# Patient Record
Sex: MALE | Race: ASIAN | NOT HISPANIC OR LATINO | Employment: FULL TIME | URBAN - METROPOLITAN AREA
[De-identification: names, ages, dates, MRNs, and addresses within clinical notes are randomized per-mention and may not be internally consistent; named-entity substitution may affect disease eponyms.]

---

## 2017-01-05 ENCOUNTER — OFFICE VISIT (OUTPATIENT)
Dept: INTERNAL MEDICINE | Facility: CLINIC | Age: 27
End: 2017-01-05
Payer: COMMERCIAL

## 2017-01-05 VITALS
TEMPERATURE: 98 F | WEIGHT: 149.5 LBS | SYSTOLIC BLOOD PRESSURE: 116 MMHG | DIASTOLIC BLOOD PRESSURE: 60 MMHG | BODY MASS INDEX: 26.48 KG/M2

## 2017-01-05 DIAGNOSIS — K29.00 OTHER ACUTE GASTRITIS WITHOUT HEMORRHAGE: Primary | ICD-10-CM

## 2017-01-05 PROCEDURE — 1159F MED LIST DOCD IN RCRD: CPT | Mod: S$GLB,,, | Performed by: FAMILY MEDICINE

## 2017-01-05 PROCEDURE — 99999 PR PBB SHADOW E&M-EST. PATIENT-LVL III: CPT | Mod: PBBFAC,,, | Performed by: FAMILY MEDICINE

## 2017-01-05 PROCEDURE — 99203 OFFICE O/P NEW LOW 30 MIN: CPT | Mod: S$GLB,,, | Performed by: FAMILY MEDICINE

## 2017-01-05 RX ORDER — OMEPRAZOLE 40 MG/1
40 CAPSULE, DELAYED RELEASE ORAL
Qty: 30 CAPSULE | Refills: 0 | Status: SHIPPED | OUTPATIENT
Start: 2017-01-05 | End: 2018-06-11

## 2017-01-05 NOTE — MR AVS SNAPSHOT
[facility] Internal Medicine  [address] [facility] [place] [address]  Phone: [phone]  Fax: [phone]                  Juan Luis   2017 10:00 AM   Office Visit    Description:  Male : 1990   Provider:  John Paul Aguirre MD   Department:  [facility] Internal Medicine           Reason for Visit     GI Problem           Diagnoses this Visit        Comments    Other acute gastritis without hemorrhage    -  Primary            To Do List           Future Appointments        Provider Department Dept Phone    2017 2:00 PM MD Sukumar Weiner IIIal - Gastroenterology [phone]    2017 10:20 AM John Paul Aguirre MD [facility] Internal Medicine [phone]      Goals (5 Years of Data)     None      Follow-Up and Disposition     Return in about 2 weeks (around 2017).    Follow-up and Disposition History       These Medications        Disp Refills Start End    omeprazole (PRILOSEC) 40 MG capsule 30 capsule 0 2017     Take 1 capsule (40 mg total) by mouth 2 (two) times daily before meals. - Oral    Pharmacy: [facility] [address] [place] [address] AT [facility] Ph #: [phone]         [facility]     [facility] Care Line -  Assistance  Registered nurses in the [facility] Center provide clinical advisement, health education, appointment booking, and other advisory services.  Call for this free service at [phone].             Medications           Message regarding Medications     Verify the changes and/or additions to your medication regime listed below are the same as discussed with your clinician today.  If any of these changes or additions are incorrect, please notify your healthcare provider.        START taking these NEW medications        Refills    omeprazole (PRILOSEC) 40 MG capsule 0    Sig: Take 1 capsule (40 mg total) by mouth 2 (two) times daily before meals.    Class: Normal    Route: Oral       STOP taking these medications     famotidine (PEPCID) 20 MG tablet Take 1 tablet (20 mg total) by mouth 2 (two) times daily as needed for Heartburn.           Verify that the below list of medications is an accurate representation of the medications you are currently taking.  If none reported, the list may be blank. If incorrect, please contact your healthcare provider. Carry this list with you in case of emergency.           Current Medications     omeprazole (PRILOSEC) 40 MG capsule Take 1 capsule (40 mg total) by mouth 2 (two) times daily before meals.    ondansetron (ZOFRAN) 4 MG tablet Take 1 tablet (4 mg total) by mouth every 8 (eight) hours as needed.           Clinical Reference Information           Vital Signs - Last Recorded  Most recent update: 1/5/2017 10:27 AM by Kerrie Lee LPN    BP Temp Wt BMI       116/60 (BP Location: Right arm, Patient Position: Sitting, BP Method: Manual) 97.8 °F (36.6 °C) (Tympanic) 67.8 kg (149 lb 7.6 oz) 26.48 kg/m2       Blood Pressure          Most Recent Value    BP  116/60      Allergies as of 1/5/2017     No Known Allergies      Immunizations Administered on Date of Encounter - 1/5/2017     None      [facility] Sign-Up     Activating your [facility] account is as easy as 1-2-3!     1) Visit [email or web address], select Sign Up Now, enter this activation code and your date of birth, then select Next.  [email or web address]  Expires: 2/11/2017  4:28 AM      2) Create a username and password to use when you visit [facility] in the future and select a security question in case you lose your password and select Next.    3) Enter your e-mail address and click Sign Up!    Additional Information  If you have questions, please e-mail [email or web address] or call [phone] to talk to our [facility] staff. Remember, [facility] is NOT to be used for urgent needs. For medical emergencies, dial 911.         Instructions      Viêm D? Dày (Ng??i L?n) [Gastritis, Adult]    Viêm d? dày  (Gastritis) là ch?ng kích ?ng l?p lót d? dày. B?nh này có th? là c?p tính (m?i) ho?c m?n tính (t?n t?i olayinka th?i peggy dài). B?nh viêm d? dày có th? có nguyên nhân là u?ng r??u quá patti?u ho?c s? d?ng các lo?i thu?c kháng viêm quá patti?u (ch?ng h?n nh? aspirin, ibuprofen, ho?c prednisone). Patti?m H pylori c?ng có th? gây ra viêm d? dày m?n tính.  Viêm d? dày có th? d?n ??n ?au t?c ho?c ?au rát ? ph?n b?ng trên. Nh?ng tri?u ch?ng khác g?m có bu?n nôn, ói m?a, m?t c?m giác thèm ?n, và ? ho?c tr??ng b?ng. Có máu khi nôn ho?c olayinka phân (màu ?? ho?c ?en) là d?u hi?u c?a holloway?t kee?t d? dày. Hi?n t??ng này c?n ph?i ch?m sóc y t? ngay l?p t?c.  Xét howard?m xem có H pylori hay không ?? sàng l?c b?nh patti?m khu?n. N?u không phát hi?n patti?m khu?n, có th? ?i?u tr? b?nh viêm d? dày b?ng cách ch?n nguyên nhân gây b?nh và ?i?u tr? b?ng các lo?i thu?c ch?ng acid cùng v?i m?t lo?i thu?c ch?n acid. N?u phát hi?n patti?m H pylori, bác s? c?ng s? kê các lo?i thu?c kháng sinh. Nh?ng ng??i t? 55 tu?i tr? lên có th? ???c ti?n hành các xét howard?m khác tr??c khi b?t ??u ?i?u tr?.  Có windy ph??ng pháp xét howard?m th??ng g?p ???c s? d?ng ?? ?ánh giá tri?u ch?ng c?a b?n. Ch?p GI th??ng (ph?n trên d? dày-ru?t) là ch?p x sahil arnie khi b?n u?ng m?t lo?i ch?t l?ng ??c có tên là barium. Ch?t l?ng này reji ph? d? dày và cho phép bác s? love sát b?t k? v?n ?? nào olayinka d? dày trên phim x sahil. M?t ph??ng pháp xét howard?m khác ???c g?i là n?i soi (endoscopy), olayinka ?ó m?t ?ng dài nh? ???c g?i là d?ng c? n?i soi (endoscope) ???c ??a yonatan c? h?ng c?a b?n vào d? dày. Có m?t chi?c camera ? ??u d?ng c? cho phép bác s? love sát bên olayinka d? dày ?? ki?m tra nguyên nhân d?n ??n các tri?u ch?ng c?a b?n.  Ch?m Sóc ? Nhà:  · U?ng thu?c ch?n acid (acid blocker) cuong toa olayinka toàn b? quá trình ?i?u tr? ngay c? khi b?n b?t ??u s?m c?m th?y kh?e h?n. Thu?c này có th? m?t vài ngày ?? ki?m soát hoàn toàn các tri?u ch?ng c?a b?n. N?u b?n không ?? ti?n tania thu?c cuong toa, b?n có  th? th? tania các lo?i thu?c ch?n acid tania t? do, ch?ng h?n nh? Pepcid AC, Tagamet, Zantac, ho?c Aciphex. N?u nh?ng thu?c này không làm gi?m các tri?u ch?ng c?a b?n, có th? th? m?t lo?i thu?c ch?n acid m?nh h?n, ch?ng h?n nh? Prilosec OTC.  · N?u b?n ?ã ???c kê m?t lo?i thu?c kháng sinh ?? ?i?u tr? ch?ng bucky?m H pylori, hãy u?ng thu?c ??y ??. U?ng thu?c ??y ?? ngay c? khi b?n s?m c?m th?y kh?e h?n. N?u b?n ng?ng s? d?ng thu?c quá s?m, b?nh bucky?m khu?n ?ó có th? tái phát và khó ?i?u tr? h?n.  · B?n có th? s? d?ng các lo?i thu?c ch?ng acid (antacids), ch?ng h?n nh? Tums, Rolaids, Mylanta, ho?c Maalox, ?? gi?m ?au. ?i?u này s? h?u ích olayinka vài ngày ngày ??u tiên arnie khi b?t ??u s? d?ng các lo?i thu?c ch?n acid khi các lo?i thu?c ch?n ?ó ch?a b?t ??u có tác d?ng. Hãy tuân cullen h??ng d?n trên nhãn thu?c. Các lo?i thu?c ch?ng acid d?ng l?ng có th? có tác d?ng t?t h?n các lo?i thu?c viên. L?u ý r?ng các thu?c ch?ng acid có th? c?n tr? vi?c h?p thu nh?ng lo?i thu?c nh?t ??nh. C? th? là, không u?ng Tagamet (cimetidine), Zantac (ranitidine), ho?c Carafate (sucralfate) olayinka vòng 1 gi? arnie khi s? d?ng m?t lo?i thu?c acid. Hãy trao ??i v?i d??c s? n?u b?n có b?t k? th?c m?c nào.  · Các tri?u ch?ng c?a b?nh viêm d? dày có th? n?ng h?n khi ?n nh?ng lo?i th?c ?n nh?t ??nh. H?n ch? ho?c tránh các lo?i th?c ?n có ch?t béo, chiên, và có nichole v?, ch?ng h?n nh? cà phê, sô cô la, b?c hà, và các lo?i th?c ?n có hàm l??ng acid phan ch?ng h?n nh? khoai tây và qu? c?ng nh? n??c các loài thu?c chi cam odsesa (cam, b??i, odessa).  · Kiêng r??u, cà phê, và thu?c lá, nh?ng th? này có th? làm ch?m quá trình lành b?nh.  · Tránh dùng aspirin và các lo?i thu?c kháng viêm ch?ng h?n nh? ibuprofen (Advil, Motrin) và naproxen (Naprosyn, Aleve). Acetaminophen (Tylenol) có th? ???c s? d?ng an toàn. Không s? d?ng bucky?u h?n li?u l??ng ghi trên nhãn.  Cullen Dõi  v?i bác s? c?a b?n, ho?c cullen s? t? v?n c?a nhân viên chúng tôi. Có th? c?n các xét howard?m b? sung.  N?u b?n không c?i thi?n olayinka 4 ngày ti?p cuong, hãy liên h? bác s? c?a b?n. N?u b?n ?ã ch?p X sahil, quét CT, ho?c ?o ECG (?i?n tâm ??), k?t qu? s? ???c m?t bác s? chuyên mo ?ánh giá. B?n s? ???c thông báo v? b?t k? k?t qu? m?i nào ?nh h??ng ??n vi?c ch?m sóc c?a b?n.  Tìm Ki?m S? Ch?m Sóc Y T? Ngay  n?u holloway?t hi?n b?t k? d?u hi?u nào arnie ?ây:  · ?au d? dày n?ng h?n ho?c bessy?n holloway?ng ph?n b?ng d??i bên ph?i (khu v?c ru?t th?a)  · ?au ng?c holloway?t hi?n ho?c n?ng h?n, ho?c nathen sang l?ng, c?, vai, ho?c cánh susan  · Ói m?a th??ng xuyên (không th? gi? l?i ch?t l?ng)  · Máu olayinka phân ho?c ói m?a (có màu ?? ho?c ?en)  · C?m th?y y?u s?c ho?c chóng m?t, ng?t x?u, ho?c khó th?  · Sô?t 100.4ºF (38ºC) ho??c phan h?n, ho??c cuong chi? dâ?n cu?a ba?c si? cu?a kobi? vi?   © 7674-1741 [employer] [address] [place] [address], [place] [address]. All rights reserved. This information is not intended as a substitute for professional medical care. Always follow your healthcare professional's instructions.

## 2017-01-05 NOTE — PATIENT INSTRUCTIONS
Viêm D? Dày (Ng??i L?n) [Gastritis, Adult]    Viêm d? dày (Gastritis) là ch?ng kích ?ng l?p lót d? dày. B?nh này có th? là c?p tính (m?i) ho?c m?n tính (t?n t?i olayinka th?i peggy dài). B?nh viêm d? dày có th? có nguyên nhân là u?ng r??u quá patti?u ho?c s? d?ng các lo?i thu?c kháng viêm quá patti?u (ch?ng h?n nh? aspirin, ibuprofen, ho?c prednisone). Patti?m H pylori c?ng có th? gây ra viêm d? dày m?n tính.  Viêm d? dày có th? d?n ??n ?au t?c ho?c ?au rát ? ph?n b?ng trên. Nh?ng tri?u ch?ng khác g?m có bu?n nôn, ói m?a, m?t c?m giác thèm ?n, và ? ho?c tr??ng b?ng. Có máu khi nôn ho?c olayinka phân (màu ?? ho?c ?en) là d?u hi?u c?a holloway?t kee?t d? dày. Hi?n t??ng này c?n ph?i ch?m sóc y t? ngay l?p t?c.  Xét howard?m xem có H pylori hay không ?? sàng l?c b?nh patti?m khu?n. N?u không phát hi?n patti?m khu?n, có th? ?i?u tr? b?nh viêm d? dày b?ng cách ch?n nguyên nhân gây b?nh và ?i?u tr? b?ng các lo?i thu?c ch?ng acid cùng v?i m?t lo?i thu?c ch?n acid. N?u phát hi?n patti?m H pylori, bác s? c?ng s? kê các lo?i thu?c kháng sinh. Nh?ng ng??i t? 55 tu?i tr? lên có th? ???c ti?n hành các xét howard?m khác tr??c khi b?t ??u ?i?u tr?.  Có windy ph??ng pháp xét howard?m th??ng g?p ???c s? d?ng ?? ?ánh giá tri?u ch?ng c?a b?n. Ch?p GI th??ng (ph?n trên d? dày-ru?t) là ch?p x sahil arnie khi b?n u?ng m?t lo?i ch?t l?ng ??c có tên là barium. Ch?t l?ng này reji ph? d? dày và cho phép bác s? love sát b?t k? v?n ?? nào olayinka d? dày trên phim x sahil. M?t ph??ng pháp xét howard?m khác ???c g?i là n?i soi (endoscopy), olayinka ?ó m?t ?ng dài nh? ???c g?i là d?ng c? n?i soi (endoscope) ???c ??a yonatan c? h?ng c?a b?n vào d? dày. Có m?t chi?c camera ? ??u d?ng c? cho phép bác s? love sát bên olayinka d? dày ?? ki?m tra nguyên nhân d?n ??n các tri?u ch?ng c?a b?n.  Ch?m Sóc ? Nhà:  · U?ng thu?c ch?n acid (acid blocker) cuong toa olayinka toàn b? quá trình ?i?u tr? ngay c? khi b?n b?t ??u s?m c?m th?y kh?e h?n. Thu?c này có th? m?t vài ngày ?? ki?m soát hoàn toàn các tri?u  ch?ng c?a b?n. N?u b?n không ?? ti?n tanai thu?c culeln toa, b?n có th? th? tania các lo?i thu?c ch?n acid tania t? do, ch?ng h?n nh? Pepcid AC, Tagamet, Zantac, ho?c Aciphex. N?u nh?ng thu?c này không làm gi?m các tri?u ch?ng c?a b?n, có th? th? m?t lo?i thu?c ch?n acid m?nh h?n, ch?ng h?n nh? Prilosec OTC.  · N?u b?n ?ã ???c kê m?t lo?i thu?c kháng sinh ?? ?i?u tr? ch?ng bucky?m H pylori, hãy u?ng thu?c ??y ??. U?ng thu?c ??y ?? ngay c? khi b?n s?m c?m th?y kh?e h?n. N?u b?n ng?ng s? d?ng thu?c quá s?m, b?nh bucky?m khu?n ?ó có th? tái phát và khó ?i?u tr? h?n.  · B?n có th? s? d?ng các lo?i thu?c ch?ng acid (antacids), ch?ng h?n nh? Tums, Rolaids, Mylanta, ho?c Maalox, ?? gi?m ?au. ?i?u này s? h?u ích olayinka vài ngày ngày ??u tiên arnie khi b?t ??u s? d?ng các lo?i thu?c ch?n acid khi các lo?i thu?c ch?n ?ó ch?a b?t ??u có tác d?ng. Hãy tuân cullen h??ng d?n trên nhãn thu?c. Các lo?i thu?c ch?ng acid d?ng l?ng có th? có tác d?ng t?t h?n các lo?i thu?c viên. L?u ý r?ng các thu?c ch?ng acid có th? c?n tr? vi?c h?p thu nh?ng lo?i thu?c nh?t ??nh. C? th? là, không u?ng Tagamet (cimetidine), Zantac (ranitidine), ho?c Carafate (sucralfate) olayinka juanito 1 gi? arnie khi s? d?ng m?t lo?i thu?c acid. Hãy trao ??i v?i d??c s? n?u b?n có b?t k? th?c m?c nào.  · Các tri?u ch?ng c?a b?nh viêm d? dày có th? n?ng h?n khi ?n nh?ng lo?i th?c ?n nh?t ??nh. H?n ch? ho?c tránh các lo?i th?c ?n có ch?t béo, chiên, và có nichole v?, ch?ng h?n nh? cà phê, sô cô la, b?c hà, và các lo?i th?c ?n có hàm l??ng acid phan ch?ng h?n nh? khoai tây và qu? c?ng nh? n??c các loài thu?c chi cam odessa (cam, b??i, odessa).  · Kiêng r??u, cà phê, và thu?c lá, nh?ng th? này có th? làm ch?m quá trình lành b?nh.  · Tránh dùng aspirin và các lo?i thu?c kháng viêm ch?ng h?n nh? ibuprofen (Advil, Motrin) và naproxen (Naprosyn, Aleve). Acetaminophen (Tylenol) có th? ???c s? d?ng an toàn. Không s? d?ng bucky?u h?n li?u l??ng ghi trên nhãn.  Cullen Dõi  v?i bác s? c?a b?n, ho?c cullen s? t?  v?n c?a nhân viên chúng tôi. Có th? c?n các xét howard?m b? sung. N?u b?n không c?i thi?n olayinka 4 ngày ti?p cuong, hãy liên h? bác s? c?a b?n. N?u b?n ?ã ch?p X sahil, quét CT, ho?c ?o ECG (?i?n tâm ??), k?t qu? s? ???c m?t bác s? chuyên mo ?ánh giá. B?n s? ???c thông báo v? b?t k? k?t qu? m?i nào ?nh h??ng ??n vi?c ch?m sóc c?a b?n.  Tìm Ki?m S? Ch?m Sóc Y T? Ngay  n?u holloway?t hi?n b?t k? d?u hi?u nào arnie ?ây:  · ?au d? dày n?ng h?n ho?c bessy?n holloway?ng ph?n b?ng d??i bên ph?i (khu v?c ru?t th?a)  · ?au ng?c holloway?t hi?n ho?c n?ng h?n, ho?c nathen sang l?ng, c?, vai, ho?c cánh susan  · Ói m?a th??ng xuyên (không th? gi? l?i ch?t l?ng)  · Máu olayinka phân ho?c ói m?a (có màu ?? ho?c ?en)  · C?m th?y y?u s?c ho?c chóng m?t, ng?t x?u, ho?c khó th?  · Sô?t 100.4ºF (38ºC) ho??c phan h?n, ho??c cuong chi? dâ?n cu?a ba?c si? cu?a kobi? vi?   © 5738-7315 [employer]. [address] [place] [address], [place] [address]. All rights reserved. This information is not intended as a substitute for professional medical care. Always follow your healthcare professional's instructions.

## 2017-01-05 NOTE — PROGRESS NOTES
Subjective:   Patient ID:  Juan Luis is a 26 y.o. male.  Chief Complaint:  GI Problem    HPI Comments: Presents for ER follow-up.  Treated initially gastric enteritis.  Zofran for nausea.  Imodium for diarrhea.  Those symptoms resolved.  Return to ER with epigastric pain despite resolution of other symptoms.  Treated with Pepcid GI cocktail.  Did well.  Discharged on Pepcid.  Still with pain today.  Nausea, vomiting, and diarrhea have not returned.  No other associated symptoms.  Pepcid not helping.    GI Problem   The primary symptoms include abdominal pain. Primary symptoms do not include fever, weight loss, fatigue, nausea, vomiting, diarrhea, melena, hematemesis, jaundice, hematochezia, dysuria, myalgias, arthralgias or rash. The illness began more than 7 days ago. The onset was sudden. The problem has been resolved.   The abdominal pain began more than 2 days ago. The abdominal pain has been unchanged since its onset. The abdominal pain is located in the epigastric region. The abdominal pain does not radiate. The severity of the abdominal pain is 3/10. The abdominal pain is relieved by nothing.   The illness does not include chills, anorexia, dysphagia, odynophagia, bloating, constipation, tenesmus, back pain or itching.     Review of Systems   Constitutional: Negative for chills, fatigue, fever and weight loss.   HENT: Negative for congestion, ear pain, postnasal drip, rhinorrhea, sinus pressure, sneezing, sore throat, trouble swallowing and voice change.    Eyes: Negative for visual disturbance.   Respiratory: Negative for cough, shortness of breath and wheezing.    Cardiovascular: Negative for chest pain, palpitations and leg swelling.   Gastrointestinal: Positive for abdominal pain. Negative for anorexia, bloating, blood in stool, constipation, diarrhea, dysphagia, hematemesis, hematochezia, jaundice, melena, nausea and vomiting.   Genitourinary: Negative for difficulty urinating, dysuria, flank pain,  frequency and hematuria.   Musculoskeletal: Negative for arthralgias, back pain and myalgias.   Skin: Negative for itching and rash.   Neurological: Negative for dizziness, weakness, light-headedness and headaches.       Current Outpatient Prescriptions:     ondansetron (ZOFRAN) 4 MG tablet, Take 1 tablet (4 mg total) by mouth every 8 (eight) hours as needed., Disp: 12 tablet, Rfl: 0    omeprazole (PRILOSEC) 40 MG capsule, Take 1 capsule (40 mg total) by mouth 2 (two) times daily before meals., Disp: 30 capsule, Rfl: 0    Objective:     Visit Vitals    /60 (BP Location: Right arm, Patient Position: Sitting, BP Method: Manual)    Temp 97.8 °F (36.6 °C) (Tympanic)    Wt 67.8 kg (149 lb 7.6 oz)    BMI 26.48 kg/m2     Physical Exam   Constitutional: Vital signs are normal. He appears well-developed and well-nourished. No distress.   HENT:   Mouth/Throat: Oropharynx is clear and moist and mucous membranes are normal.   Eyes: No scleral icterus.   Neck: Normal range of motion. Neck supple.   Cardiovascular: Normal rate, regular rhythm and normal heart sounds.  Exam reveals no gallop and no friction rub.    No murmur heard.  Pulmonary/Chest: Effort normal and breath sounds normal. He has no wheezes. He has no rhonchi.   Abdominal: Soft. Normal appearance and bowel sounds are normal. He exhibits no distension. There is tenderness in the epigastric area. There is no rebound, no guarding and no CVA tenderness.   Skin: No rash noted.   Psychiatric: He has a normal mood and affect.     Assessment:     1. Other acute gastritis without hemorrhage      Plan:   Other acute gastritis without hemorrhage  -     omeprazole (PRILOSEC) 40 MG capsule; Take 1 capsule (40 mg total) by mouth 2 (two) times daily before meals.  Dispense: 30 capsule; Refill: 0    DC Pepcid.  Double dose PPI for 2 weeks.  Avoid exacerbating foods.  RTC 2 weeks for follow up

## 2018-06-11 ENCOUNTER — HOSPITAL ENCOUNTER (EMERGENCY)
Facility: HOSPITAL | Age: 28
Discharge: HOME | End: 2018-06-11
Attending: EMERGENCY MEDICINE
Payer: COMMERCIAL

## 2018-06-11 VITALS
HEART RATE: 59 BPM | HEIGHT: 63 IN | SYSTOLIC BLOOD PRESSURE: 128 MMHG | TEMPERATURE: 98 F | RESPIRATION RATE: 18 BRPM | OXYGEN SATURATION: 100 % | WEIGHT: 170 LBS | DIASTOLIC BLOOD PRESSURE: 70 MMHG | BODY MASS INDEX: 30.12 KG/M2

## 2018-06-11 DIAGNOSIS — N20.1 URETERAL CALCULI: Primary | ICD-10-CM

## 2018-06-11 LAB
ALBUMIN SERPL BCP-MCNC: 4 G/DL
ALP SERPL-CCNC: 47 U/L
ALT SERPL W/O P-5'-P-CCNC: 15 U/L
ANION GAP SERPL CALC-SCNC: 10 MMOL/L
AST SERPL-CCNC: 16 U/L
BASOPHILS # BLD AUTO: 0.03 K/UL
BASOPHILS NFR BLD: 0.3 %
BILIRUB SERPL-MCNC: 0.4 MG/DL
BILIRUB UR QL STRIP: NEGATIVE
BUN SERPL-MCNC: 20 MG/DL
CALCIUM SERPL-MCNC: 9.1 MG/DL
CHLORIDE SERPL-SCNC: 107 MMOL/L
CLARITY UR: CLEAR
CO2 SERPL-SCNC: 21 MMOL/L
COLOR UR: YELLOW
CREAT SERPL-MCNC: 0.8 MG/DL
DIFFERENTIAL METHOD: ABNORMAL
EOSINOPHIL # BLD AUTO: 0.6 K/UL
EOSINOPHIL NFR BLD: 5.6 %
ERYTHROCYTE [DISTWIDTH] IN BLOOD BY AUTOMATED COUNT: 13 %
EST. GFR  (AFRICAN AMERICAN): >60 ML/MIN/1.73 M^2
EST. GFR  (NON AFRICAN AMERICAN): >60 ML/MIN/1.73 M^2
GLUCOSE SERPL-MCNC: 121 MG/DL
GLUCOSE UR QL STRIP: NEGATIVE
HCT VFR BLD AUTO: 42.2 %
HGB BLD-MCNC: 14.9 G/DL
HGB UR QL STRIP: ABNORMAL
KETONES UR QL STRIP: NEGATIVE
LEUKOCYTE ESTERASE UR QL STRIP: NEGATIVE
LYMPHOCYTES # BLD AUTO: 5.1 K/UL
LYMPHOCYTES NFR BLD: 47.8 %
MCH RBC QN AUTO: 32.1 PG
MCHC RBC AUTO-ENTMCNC: 35.3 G/DL
MCV RBC AUTO: 91 FL
MICROSCOPIC COMMENT: ABNORMAL
MONOCYTES # BLD AUTO: 0.7 K/UL
MONOCYTES NFR BLD: 6.6 %
NEUTROPHILS # BLD AUTO: 4.2 K/UL
NEUTROPHILS NFR BLD: 39.7 %
NITRITE UR QL STRIP: NEGATIVE
PH UR STRIP: 6 [PH] (ref 5–8)
PLATELET # BLD AUTO: 163 K/UL
PMV BLD AUTO: 10.5 FL
POTASSIUM SERPL-SCNC: 3.5 MMOL/L
PROT SERPL-MCNC: 6.8 G/DL
PROT UR QL STRIP: ABNORMAL
RBC # BLD AUTO: 4.64 M/UL
RBC #/AREA URNS HPF: 10 /HPF (ref 0–4)
SODIUM SERPL-SCNC: 138 MMOL/L
SP GR UR STRIP: >=1.03 (ref 1–1.03)
URN SPEC COLLECT METH UR: ABNORMAL
UROBILINOGEN UR STRIP-ACNC: NEGATIVE EU/DL
WBC # BLD AUTO: 10.68 K/UL

## 2018-06-11 PROCEDURE — 99284 EMERGENCY DEPT VISIT MOD MDM: CPT | Mod: 25

## 2018-06-11 PROCEDURE — 96374 THER/PROPH/DIAG INJ IV PUSH: CPT

## 2018-06-11 PROCEDURE — 85025 COMPLETE CBC W/AUTO DIFF WBC: CPT

## 2018-06-11 PROCEDURE — 25000003 PHARM REV CODE 250: Performed by: EMERGENCY MEDICINE

## 2018-06-11 PROCEDURE — 81000 URINALYSIS NONAUTO W/SCOPE: CPT

## 2018-06-11 PROCEDURE — 96376 TX/PRO/DX INJ SAME DRUG ADON: CPT

## 2018-06-11 PROCEDURE — 80053 COMPREHEN METABOLIC PANEL: CPT

## 2018-06-11 PROCEDURE — 63600175 PHARM REV CODE 636 W HCPCS: Performed by: EMERGENCY MEDICINE

## 2018-06-11 PROCEDURE — 96361 HYDRATE IV INFUSION ADD-ON: CPT

## 2018-06-11 PROCEDURE — 96375 TX/PRO/DX INJ NEW DRUG ADDON: CPT

## 2018-06-11 RX ORDER — ONDANSETRON 4 MG/1
4 TABLET, FILM COATED ORAL EVERY 8 HOURS PRN
Qty: 12 TABLET | Refills: 0 | OUTPATIENT
Start: 2018-06-11 | End: 2021-08-31

## 2018-06-11 RX ORDER — KETOROLAC TROMETHAMINE 30 MG/ML
15 INJECTION, SOLUTION INTRAMUSCULAR; INTRAVENOUS
Status: COMPLETED | OUTPATIENT
Start: 2018-06-11 | End: 2018-06-11

## 2018-06-11 RX ORDER — HYDROCODONE BITARTRATE AND ACETAMINOPHEN 7.5; 325 MG/1; MG/1
1 TABLET ORAL EVERY 4 HOURS PRN
Qty: 15 TABLET | Refills: 0 | OUTPATIENT
Start: 2018-06-11 | End: 2021-08-31

## 2018-06-11 RX ORDER — ONDANSETRON 2 MG/ML
4 INJECTION INTRAMUSCULAR; INTRAVENOUS
Status: COMPLETED | OUTPATIENT
Start: 2018-06-11 | End: 2018-06-11

## 2018-06-11 RX ORDER — TAMSULOSIN HYDROCHLORIDE 0.4 MG/1
0.4 CAPSULE ORAL
Status: COMPLETED | OUTPATIENT
Start: 2018-06-11 | End: 2018-06-11

## 2018-06-11 RX ADMIN — KETOROLAC TROMETHAMINE 15 MG: 30 INJECTION, SOLUTION INTRAMUSCULAR; INTRAVENOUS at 04:06

## 2018-06-11 RX ADMIN — KETOROLAC TROMETHAMINE 15 MG: 30 INJECTION, SOLUTION INTRAMUSCULAR; INTRAVENOUS at 05:06

## 2018-06-11 RX ADMIN — SODIUM CHLORIDE 1000 ML: 0.9 INJECTION, SOLUTION INTRAVENOUS at 04:06

## 2018-06-11 RX ADMIN — TAMSULOSIN HYDROCHLORIDE 0.4 MG: 0.4 CAPSULE ORAL at 05:06

## 2018-06-11 RX ADMIN — ONDANSETRON 4 MG: 2 INJECTION INTRAMUSCULAR; INTRAVENOUS at 05:06

## 2018-06-11 NOTE — ED PROVIDER NOTES
SCRIBE #1 NOTE: I, Tamie Burton, am scribing for, and in the presence of, Bradford Burton MD. I have scribed the entire note.      History      Chief Complaint   Patient presents with    Flank Pain     R flank pain starting at 1 hr PTA       Review of patient's allergies indicates:  No Known Allergies     HPI   HPI    6/11/2018, 4:09 AM   History obtained from the patient      History of Present Illness: Juan Luis is a 27 y.o. male patient who presents to the Emergency Department for R flank pain which onset gradually today at 1 PM. Symptoms are constant and moderate in severity. Pt states he was sleeping when the pain occurred. No mitigating or exacerbating factors reported. Associated sxs include chills and dysuria. Patient denies any fever, diaphoresis, abd pain, n/v/d, hematuria, frequency/urgency, back pain, dizziness, and all other sxs at this time. No prior Tx. No further complaints or concerns at this time.         Arrival mode: Personal vehicle      PCP: Primary Doctor No       Past Medical History:  History reviewed. No pertinent History.      Past Surgical History:  History reviewed. No pertinent History.      Family History:  History reviewed. No pertinent History.      Social History:  Social History     Social History Main Topics    Smoking status: Former Smoker    Smokeless tobacco: Unknown    Alcohol use Unknown    Drug use: Unknown    Sexual activity: Unknown       ROS   Review of Systems   Constitutional: Positive for chills. Negative for diaphoresis and fever.   HENT: Negative for sore throat.    Respiratory: Negative for shortness of breath.    Cardiovascular: Negative for chest pain.   Gastrointestinal: Negative for nausea.   Genitourinary: Positive for dysuria and flank pain (Right). Negative for frequency, hematuria and urgency.   Musculoskeletal: Negative for back pain.   Skin: Negative for rash.   Neurological: Negative for weakness.   Hematological: Does not bruise/bleed easily.   All  "other systems reviewed and are negative.      Physical Exam      Initial Vitals [06/11/18 0354]   BP Pulse Resp Temp SpO2   136/69 (!) 51 20 98.4 °F (36.9 °C) 100 %      MAP       91.33          Physical Exam  Nursing Notes and Vital Signs Reviewed.  Constitutional: Patient is in no apparent distress. Well-developed and well-nourished.  Head: Atraumatic. Normocephalic.  Eyes: PERRL. EOM intact. Conjunctivae are not pale. No scleral icterus.  ENT: Mucous membranes are moist. Oropharynx is clear and symmetric.    Neck: Supple. Full ROM. No lymphadenopathy.  Cardiovascular: Regular rate. Regular rhythm. No murmurs, rubs, or gallops. Distal pulses are 2+ and symmetric.  Pulmonary/Chest: No respiratory distress. Clear to auscultation bilaterally. No wheezing or rales.  Abdominal: Soft and non-distended.  There is no tenderness.  No rebound, guarding, or rigidity. Good bowel sounds.  Genitourinary: Right CVA tenderness  Musculoskeletal: Moves all extremities. No obvious deformities. No edema. No calf tenderness.  Skin: Warm and dry.  Neurological:  Alert, awake, and appropriate.  Normal speech.  No acute focal neurological deficits are appreciated.  Psychiatric: Normal affect. Good eye contact. Appropriate in content.    ED Course    Procedures  ED Vital Signs:  Vitals:    06/11/18 0354 06/11/18 0449 06/11/18 0540   BP: 136/69 131/71 128/70   Pulse: (!) 51 (!) 53 (!) 59   Resp: 20 18 18   Temp: 98.4 °F (36.9 °C) 98.6 °F (37 °C) 98.4 °F (36.9 °C)   TempSrc: Oral Oral    SpO2: 100% 100% 100%   Weight: 77.1 kg (169 lb 15.6 oz)     Height: 5' 3" (1.6 m)         Abnormal Lab Results:  Labs Reviewed   CBC W/ AUTO DIFFERENTIAL - Abnormal; Notable for the following:        Result Value    MCH 32.1 (*)     Lymph # 5.1 (*)     Eos # 0.6 (*)     All other components within normal limits   COMPREHENSIVE METABOLIC PANEL - Abnormal; Notable for the following:     CO2 21 (*)     Glucose 121 (*)     Alkaline Phosphatase 47 (*)     All " other components within normal limits   URINALYSIS - Abnormal; Notable for the following:     Specific Gravity, UA >=1.030 (*)     Protein, UA Trace (*)     Occult Blood UA 2+ (*)     All other components within normal limits   URINALYSIS MICROSCOPIC - Abnormal; Notable for the following:     RBC, UA 10 (*)     All other components within normal limits        All Lab Results:  Results for orders placed or performed during the hospital encounter of 06/11/18   CBC auto differential   Result Value Ref Range    WBC 10.68 3.90 - 12.70 K/uL    RBC 4.64 4.60 - 6.20 M/uL    Hemoglobin 14.9 14.0 - 18.0 g/dL    Hematocrit 42.2 40.0 - 54.0 %    MCV 91 82 - 98 fL    MCH 32.1 (H) 27.0 - 31.0 pg    MCHC 35.3 32.0 - 36.0 g/dL    RDW 13.0 11.5 - 14.5 %    Platelets 163 150 - 350 K/uL    MPV 10.5 9.2 - 12.9 fL    Gran # (ANC) 4.2 1.8 - 7.7 K/uL    Lymph # 5.1 (H) 1.0 - 4.8 K/uL    Mono # 0.7 0.3 - 1.0 K/uL    Eos # 0.6 (H) 0.0 - 0.5 K/uL    Baso # 0.03 0.00 - 0.20 K/uL    Gran% 39.7 38.0 - 73.0 %    Lymph% 47.8 18.0 - 48.0 %    Mono% 6.6 4.0 - 15.0 %    Eosinophil% 5.6 0.0 - 8.0 %    Basophil% 0.3 0.0 - 1.9 %    Differential Method Automated    Comprehensive metabolic panel   Result Value Ref Range    Sodium 138 136 - 145 mmol/L    Potassium 3.5 3.5 - 5.1 mmol/L    Chloride 107 95 - 110 mmol/L    CO2 21 (L) 23 - 29 mmol/L    Glucose 121 (H) 70 - 110 mg/dL    BUN, Bld 20 6 - 20 mg/dL    Creatinine 0.8 0.5 - 1.4 mg/dL    Calcium 9.1 8.7 - 10.5 mg/dL    Total Protein 6.8 6.0 - 8.4 g/dL    Albumin 4.0 3.5 - 5.2 g/dL    Total Bilirubin 0.4 0.1 - 1.0 mg/dL    Alkaline Phosphatase 47 (L) 55 - 135 U/L    AST 16 10 - 40 U/L    ALT 15 10 - 44 U/L    Anion Gap 10 8 - 16 mmol/L    eGFR if African American >60 >60 mL/min/1.73 m^2    eGFR if non African American >60 >60 mL/min/1.73 m^2   Urinalysis - clean catch   Result Value Ref Range    Specimen UA Urine, Clean Catch     Color, UA Yellow Yellow, Straw, Maribel    Appearance, UA Clear Clear     pH, UA 6.0 5.0 - 8.0    Specific Gravity, UA >=1.030 (A) 1.005 - 1.030    Protein, UA Trace (A) Negative    Glucose, UA Negative Negative    Ketones, UA Negative Negative    Bilirubin (UA) Negative Negative    Occult Blood UA 2+ (A) Negative    Nitrite, UA Negative Negative    Urobilinogen, UA Negative <2.0 EU/dL    Leukocytes, UA Negative Negative   Urinalysis Microscopic   Result Value Ref Range    RBC, UA 10 (H) 0 - 4 /hpf    Microscopic Comment SEE COMMENT          Imaging Results:  Imaging Results          CT Renal Stone Study ABD Pelvis WO (Final result)  Result time 06/11/18 08:00:51    Final result by Armond Payton MD (06/11/18 08:00:51)                 Impression:      Left nephrolithiasis.    Right hydroureteronephrosis with 3 mm right UVJ calculus.    All CT scans at this facility use automated dose modulation, iterative reconstruction and/or weight based dosing techniques when appropriate to reduce radiation dose to as low as reasonably achievable.      Electronically signed by: Armond Payton MD  Date:    06/11/2018  Time:    08:00             Narrative:    EXAMINATION:  CT RENAL STONE STUDY ABD PELVIS WO    CLINICAL HISTORY:  Flank pain, stone disease suspected;    TECHNIQUE:  Limited noncontrast CT scan of the abdomen and pelvis.    FINDINGS:  The lung bases are clear.    The liver, spleen, and pancreas appear grossly normal.    The visible bowel is grossly unremarkable.    The left kidney demonstrates several small calcifications the largest in the upper pole approximately 2 mm in size.    The right kidney reveals mild right hydroureteronephrosis.  A right UVJ calculus is present approximately 3 mm in size.                               CT abdomen and pelvis without IV contrast  Impression:  1. 2-3 mm stone in the right ureterovesicular junction resulting in mild right hydronephrosis and hydroureter.  2. Punctate nonobstructive left renal stones.    Signed by: José Miguel Elliott  MD  Time: 05:33         The Emergency Provider reviewed the vital signs and test results, which are outlined above.    ED Discussion     5:05 AM: Re-evaluated pt. Pt is resting comfortably and is in no acute distress.  D/w pt all pertinent results. D/w pt any concerns expressed at this time. Answered all questions. Pt expresses understanding at this time.    5:39 AM: Reassessed pt at this time.  Pt states his condition has improved at this time. Discussed with pt all pertinent ED information and results. Discussed pt dx and plan of tx. Gave pt all f/u and return to the ED instructions. All questions and concerns were addressed at this time. Pt expresses understanding of information and instructions, and is comfortable with plan to discharge. Pt is stable for discharge.    I discussed with patient and/or family/caretaker that evaluation in the ED does not suggest any emergent or life threatening medical conditions requiring immediate intervention beyond what was provided in the ED, and I believe patient is safe for discharge.  Regardless, an unremarkable evaluation in the ED does not preclude the development or presence of a serious of life threatening condition. As such, patient was instructed to return immediately for any worsening or change in current symptoms.      ED Medication(s):  Medications   sodium chloride 0.9% bolus 1,000 mL (0 mLs Intravenous Stopped 6/11/18 0540)   ketorolac injection 15 mg (15 mg Intravenous Given 6/11/18 0419)   ketorolac injection 15 mg (15 mg Intravenous Given 6/11/18 0518)   tamsulosin 24 hr capsule 0.4 mg (0.4 mg Oral Given 6/11/18 0519)   ondansetron injection 4 mg (4 mg Intravenous Given 6/11/18 0522)       Discharge Medication List as of 6/11/2018  5:39 AM      START taking these medications    Details   HYDROcodone-acetaminophen (NORCO) 7.5-325 mg per tablet Take 1 tablet by mouth every 4 (four) hours as needed for Pain., Starting Mon 6/11/2018, Print             Follow-up  Information     [facility]. Schedule an appointment as soon as possible for a visit in 2 days.    Specialty:  Urology  Contact information:  [address]  [facility] [id number] [phone]           [facility] - .    Specialty:  Emergency Medicine  Why:  As needed, If symptoms worsen  Contact information:  [address] [facility] [id number] [phone]                   Medical Decision Making    Medical Decision Making:   Clinical Tests:   Lab Tests: Reviewed and Ordered  Radiological Study: Reviewed and Ordered           Scribe Attestation:   Scribe #1: I performed the above scribed service and the documentation accurately describes the services I performed. I attest to the accuracy of the note.    Attending:   Physician Attestation Statement for Scribe #1: I, Bradford Burton MD, personally performed the services described in this documentation, as scribed by Tamie Burton, in my presence, and it is both accurate and complete.          Clinical Impression       ICD-10-CM ICD-9-CM   1. Ureteral calculi N20.1 592.1       Disposition:   Disposition: Discharged  Condition: Stable         Bradford Burton MD  06/12/18 0559

## 2021-01-20 ENCOUNTER — HOSPITAL ENCOUNTER (EMERGENCY)
Facility: HOSPITAL | Age: 31
Discharge: HOME | End: 2021-01-20
Attending: EMERGENCY MEDICINE
Payer: COMMERCIAL

## 2021-01-20 VITALS
HEART RATE: 78 BPM | TEMPERATURE: 99 F | DIASTOLIC BLOOD PRESSURE: 69 MMHG | RESPIRATION RATE: 18 BRPM | WEIGHT: 168 LBS | SYSTOLIC BLOOD PRESSURE: 122 MMHG | OXYGEN SATURATION: 98 % | BODY MASS INDEX: 29.77 KG/M2 | HEIGHT: 63 IN

## 2021-01-20 DIAGNOSIS — M53.3 TAIL BONE PAIN: Primary | ICD-10-CM

## 2021-01-20 PROCEDURE — 99284 EMERGENCY DEPT VISIT MOD MDM: CPT

## 2021-01-20 RX ORDER — TRAMADOL HYDROCHLORIDE 50 MG/1
50 TABLET ORAL EVERY 6 HOURS PRN
Qty: 12 TABLET | Refills: 0 | OUTPATIENT
Start: 2021-01-20 | End: 2021-08-31

## 2021-01-20 RX ORDER — CLINDAMYCIN HYDROCHLORIDE 300 MG/1
300 CAPSULE ORAL EVERY 6 HOURS
Qty: 28 CAPSULE | Refills: 0 | Status: SHIPPED | OUTPATIENT
Start: 2021-01-20 | End: 2021-01-27

## 2021-01-20 RX ORDER — IBUPROFEN 800 MG/1
800 TABLET ORAL EVERY 6 HOURS PRN
Qty: 20 TABLET | Refills: 0 | OUTPATIENT
Start: 2021-01-20 | End: 2021-08-31

## 2021-08-31 ENCOUNTER — HOSPITAL ENCOUNTER (EMERGENCY)
Facility: HOSPITAL | Age: 31
Discharge: HOME | End: 2021-08-31
Attending: EMERGENCY MEDICINE
Payer: COMMERCIAL

## 2021-08-31 VITALS
OXYGEN SATURATION: 98 % | HEART RATE: 75 BPM | SYSTOLIC BLOOD PRESSURE: 129 MMHG | BODY MASS INDEX: 30.6 KG/M2 | RESPIRATION RATE: 18 BRPM | DIASTOLIC BLOOD PRESSURE: 75 MMHG | TEMPERATURE: 99 F | WEIGHT: 172.75 LBS

## 2021-08-31 DIAGNOSIS — N20.0 NEPHROLITHIASIS: ICD-10-CM

## 2021-08-31 DIAGNOSIS — N20.1 LEFT URETERAL STONE: Primary | ICD-10-CM

## 2021-08-31 LAB
ALBUMIN SERPL BCP-MCNC: 4 G/DL (ref 3.5–5.2)
ALP SERPL-CCNC: 50 U/L (ref 55–135)
ALT SERPL W/O P-5'-P-CCNC: 22 U/L (ref 10–44)
ANION GAP SERPL CALC-SCNC: 11 MMOL/L (ref 8–16)
AST SERPL-CCNC: 20 U/L (ref 10–40)
BACTERIA #/AREA URNS HPF: ABNORMAL /HPF
BASOPHILS # BLD AUTO: 0.05 K/UL (ref 0–0.2)
BASOPHILS NFR BLD: 0.3 % (ref 0–1.9)
BILIRUB SERPL-MCNC: 0.4 MG/DL (ref 0.1–1)
BILIRUB UR QL STRIP: NEGATIVE
BUN SERPL-MCNC: 10 MG/DL (ref 6–20)
CALCIUM SERPL-MCNC: 9.2 MG/DL (ref 8.7–10.5)
CHLORIDE SERPL-SCNC: 108 MMOL/L (ref 95–110)
CLARITY UR: CLEAR
CO2 SERPL-SCNC: 21 MMOL/L (ref 23–29)
COLOR UR: YELLOW
CREAT SERPL-MCNC: 0.8 MG/DL (ref 0.5–1.4)
DIFFERENTIAL METHOD: ABNORMAL
EOSINOPHIL # BLD AUTO: 0.4 K/UL (ref 0–0.5)
EOSINOPHIL NFR BLD: 2.8 % (ref 0–8)
ERYTHROCYTE [DISTWIDTH] IN BLOOD BY AUTOMATED COUNT: 11.9 % (ref 11.5–14.5)
EST. GFR  (AFRICAN AMERICAN): >60 ML/MIN/1.73 M^2
EST. GFR  (NON AFRICAN AMERICAN): >60 ML/MIN/1.73 M^2
GLUCOSE SERPL-MCNC: 123 MG/DL (ref 70–110)
GLUCOSE UR QL STRIP: NEGATIVE
HCT VFR BLD AUTO: 42.6 % (ref 40–54)
HCV AB SERPL QL IA: NEGATIVE
HEP C VIRUS HOLD SPECIMEN: NORMAL
HGB BLD-MCNC: 14.3 G/DL (ref 14–18)
HGB UR QL STRIP: ABNORMAL
HIV 1+2 AB+HIV1 P24 AG SERPL QL IA: NEGATIVE
IMM GRANULOCYTES # BLD AUTO: 0.08 K/UL (ref 0–0.04)
IMM GRANULOCYTES NFR BLD AUTO: 0.5 % (ref 0–0.5)
KETONES UR QL STRIP: NEGATIVE
LEUKOCYTE ESTERASE UR QL STRIP: NEGATIVE
LYMPHOCYTES # BLD AUTO: 3 K/UL (ref 1–4.8)
LYMPHOCYTES NFR BLD: 19.8 % (ref 18–48)
MCH RBC QN AUTO: 30.8 PG (ref 27–31)
MCHC RBC AUTO-ENTMCNC: 33.6 G/DL (ref 32–36)
MCV RBC AUTO: 92 FL (ref 82–98)
MICROSCOPIC COMMENT: ABNORMAL
MONOCYTES # BLD AUTO: 0.9 K/UL (ref 0.3–1)
MONOCYTES NFR BLD: 5.7 % (ref 4–15)
NEUTROPHILS # BLD AUTO: 10.8 K/UL (ref 1.8–7.7)
NEUTROPHILS NFR BLD: 70.9 % (ref 38–73)
NITRITE UR QL STRIP: NEGATIVE
NRBC BLD-RTO: 0 /100 WBC
PH UR STRIP: 6 [PH] (ref 5–8)
PLATELET # BLD AUTO: 205 K/UL (ref 150–450)
PMV BLD AUTO: 10 FL (ref 9.2–12.9)
POTASSIUM SERPL-SCNC: 3.7 MMOL/L (ref 3.5–5.1)
PROT SERPL-MCNC: 7 G/DL (ref 6–8.4)
PROT UR QL STRIP: NEGATIVE
RBC # BLD AUTO: 4.64 M/UL (ref 4.6–6.2)
RBC #/AREA URNS HPF: 12 /HPF (ref 0–4)
SODIUM SERPL-SCNC: 140 MMOL/L (ref 136–145)
SP GR UR STRIP: 1.02 (ref 1–1.03)
URN SPEC COLLECT METH UR: ABNORMAL
UROBILINOGEN UR STRIP-ACNC: NEGATIVE EU/DL
WBC # BLD AUTO: 15.27 K/UL (ref 3.9–12.7)

## 2021-08-31 PROCEDURE — 86803 HEPATITIS C AB TEST: CPT | Performed by: EMERGENCY MEDICINE

## 2021-08-31 PROCEDURE — 96375 TX/PRO/DX INJ NEW DRUG ADDON: CPT

## 2021-08-31 PROCEDURE — 96374 THER/PROPH/DIAG INJ IV PUSH: CPT

## 2021-08-31 PROCEDURE — 80053 COMPREHEN METABOLIC PANEL: CPT | Performed by: EMERGENCY MEDICINE

## 2021-08-31 PROCEDURE — 25000003 PHARM REV CODE 250: Performed by: EMERGENCY MEDICINE

## 2021-08-31 PROCEDURE — 87389 HIV-1 AG W/HIV-1&-2 AB AG IA: CPT | Performed by: EMERGENCY MEDICINE

## 2021-08-31 PROCEDURE — 85025 COMPLETE CBC W/AUTO DIFF WBC: CPT | Performed by: EMERGENCY MEDICINE

## 2021-08-31 PROCEDURE — 63600175 PHARM REV CODE 636 W HCPCS: Performed by: EMERGENCY MEDICINE

## 2021-08-31 PROCEDURE — 99285 EMERGENCY DEPT VISIT HI MDM: CPT | Mod: 25

## 2021-08-31 PROCEDURE — 81000 URINALYSIS NONAUTO W/SCOPE: CPT | Performed by: EMERGENCY MEDICINE

## 2021-08-31 RX ORDER — KETOROLAC TROMETHAMINE 30 MG/ML
30 INJECTION, SOLUTION INTRAMUSCULAR; INTRAVENOUS
Status: COMPLETED | OUTPATIENT
Start: 2021-08-31 | End: 2021-08-31

## 2021-08-31 RX ORDER — ONDANSETRON 4 MG/1
4 TABLET, FILM COATED ORAL EVERY 6 HOURS PRN
Qty: 12 TABLET | Refills: 0 | Status: SHIPPED | OUTPATIENT
Start: 2021-08-31

## 2021-08-31 RX ORDER — MORPHINE SULFATE 4 MG/ML
4 INJECTION, SOLUTION INTRAMUSCULAR; INTRAVENOUS
Status: COMPLETED | OUTPATIENT
Start: 2021-08-31 | End: 2021-08-31

## 2021-08-31 RX ORDER — HYDROCODONE BITARTRATE AND ACETAMINOPHEN 10; 325 MG/1; MG/1
1 TABLET ORAL EVERY 6 HOURS PRN
Qty: 18 TABLET | Refills: 0 | Status: SHIPPED | OUTPATIENT
Start: 2021-08-31

## 2021-08-31 RX ORDER — TAMSULOSIN HYDROCHLORIDE 0.4 MG/1
0.4 CAPSULE ORAL DAILY
Qty: 30 CAPSULE | Refills: 0 | Status: SHIPPED | OUTPATIENT
Start: 2021-08-31 | End: 2022-08-31

## 2021-08-31 RX ORDER — OXYCODONE AND ACETAMINOPHEN 10; 325 MG/1; MG/1
1 TABLET ORAL
Status: COMPLETED | OUTPATIENT
Start: 2021-08-31 | End: 2021-08-31

## 2021-08-31 RX ORDER — ONDANSETRON 2 MG/ML
4 INJECTION INTRAMUSCULAR; INTRAVENOUS
Status: COMPLETED | OUTPATIENT
Start: 2021-08-31 | End: 2021-08-31

## 2021-08-31 RX ADMIN — MORPHINE SULFATE 4 MG: 4 INJECTION INTRAVENOUS at 11:08

## 2021-08-31 RX ADMIN — KETOROLAC TROMETHAMINE 30 MG: 30 INJECTION, SOLUTION INTRAMUSCULAR; INTRAVENOUS at 10:08

## 2021-08-31 RX ADMIN — OXYCODONE AND ACETAMINOPHEN 1 TABLET: 10; 325 TABLET ORAL at 12:08

## 2021-08-31 RX ADMIN — ONDANSETRON 4 MG: 2 INJECTION INTRAMUSCULAR; INTRAVENOUS at 10:08
